# Patient Record
(demographics unavailable — no encounter records)

---

## 2017-08-10 NOTE — ED NOTES
Patient is cleared for discharge per MD. Currently no medical necessity for patient to stay at hospital. Discharge instructions reviewed with patient including when to follow up with healthcare providers and when to seek emergency care. Security dept.  [place]

## 2017-08-10 NOTE — ED PROVIDER NOTES
Patient Seen in: [facility] AND [facility] Emergency Department    History   Patient presents with:  Alcohol Intoxication (neurologic)    Stated Complaint: etoh    HPI     62 y/o male with a h/o prostate enlargement who was publicly intoxicated tonight in front motion. No edema or tenderness. Neurological: Alert and oriented to person, place, and time. Skin: Skin is warm and dry. Psychiatric: Normal mood and affect. Behavior is normal.   Nursing note and vitals reviewed.     Differential diagnosis includes

## 2017-08-10 NOTE — ED INITIAL ASSESSMENT (HPI)
Christopher Oneill arrives to ED via [facility] paramedics for ETOH intoxication. He has no complaints. He was intoxicated in front of the bar he was drinking at and a concerned citizen called 911.

## (undated) NOTE — ED AVS SNAPSHOT
Chrissy Pizarro   MRN: [id number]    Department:  [facility] Emergency Department   Date of Visit:  8/9/2017           Disclosure     Insurance plans vary and the physician(s) referred by the ER may not be covered by your plan.  Please contact you CARE PHYSICIAN AT ONCE OR RETURN IMMEDIATELY TO THE EMERGENCY DEPARTMENT. If you have been prescribed any medication(s), please fill your prescription right away and begin taking the medication(s) as directed.   If you believe that any of the medications